# Patient Record
(demographics unavailable — no encounter records)

---

## 2024-12-14 NOTE — HISTORY OF PRESENT ILLNESS
[Patient reported mammogram was normal] : Patient reported mammogram was normal [Patient reported breast sonogram was normal] : Patient reported breast sonogram was normal [Patient reported bone density results were normal] : Patient reported bone density results were normal [N] : Patient is not sexually active [No] : Patient does not have concerns regarding sex [Previously active] : previously active [FreeTextEntry1] : 66 yo menopausal at age 52 presents for an annual w/o GYN  complaints.  FH of breast cancer sister and GMD [Mammogramdate] : 12/2/24 [BreastSonogramDate] : 12/24 [PapSmeardate] : 2021 [ColonoscopyDate] : 2022 [BoneDensityDate] : 2023 [[facility]] : 1 [PGHxTotal] : 2 [[facility]] : 1 [PGHxABSpont] : 1

## 2024-12-14 NOTE — PHYSICAL EXAM
[Chaperone Present] : A chaperone was present in the examining room during all aspects of the physical examination [Chaperone Declined] : Patient declined chaperone [Appropriately responsive] : appropriately responsive [Alert] : alert [No Acute Distress] : no acute distress [No Lymphadenopathy] : no lymphadenopathy [Soft] : soft [Non-tender] : non-tender [Non-distended] : non-distended [No HSM] : No HSM [No Lesions] : no lesions [No Mass] : no mass [Oriented x3] : oriented x3 [Examination Of The Breasts] : a normal appearance [No Discharge] : no discharge [No Masses] : no breast masses were palpable [Labia Majora] : normal [Labia Minora] : normal [No Bleeding] : There was no active vaginal bleeding [Normal] : normal [Uterine Adnexae] : normal

## 2024-12-14 NOTE — HISTORY OF PRESENT ILLNESS
[Patient reported mammogram was normal] : Patient reported mammogram was normal [Patient reported breast sonogram was normal] : Patient reported breast sonogram was normal [Patient reported bone density results were normal] : Patient reported bone density results were normal [N] : Patient is not sexually active [No] : Patient does not have concerns regarding sex [Previously active] : previously active [FreeTextEntry1] : 66 yo menopausal at age 52 presents for an annual w/o GYN  complaints.  FH of breast cancer sister and GMD [Mammogramdate] : 12/2/24 [BreastSonogramDate] : 12/24 [PapSmeardate] : 2021 [BoneDensityDate] : 2023 [ColonoscopyDate] : 2022 [PGHxTotal] : 2 [[facility]] : 1 [[facility] [place]iving] : 1 [PGHxABSpont] : 1

## 2024-12-14 NOTE — COUNSELING
[Nutrition/ Exercise/ Weight Management] : nutrition, exercise, weight management [Breast Self Exam] : breast self exam [Bladder Hygiene] : bladder hygiene [FreeTextEntry2] : Bone health.  Hereditary cancer blood test